# Patient Record
Sex: MALE | Race: WHITE
[De-identification: names, ages, dates, MRNs, and addresses within clinical notes are randomized per-mention and may not be internally consistent; named-entity substitution may affect disease eponyms.]

---

## 2018-09-20 ENCOUNTER — HOSPITAL ENCOUNTER (INPATIENT)
Dept: HOSPITAL 92 - ERS | Age: 58
LOS: 5 days | Discharge: ANOTHER HOSPITAL | DRG: 435 | End: 2018-09-25
Attending: INTERNAL MEDICINE | Admitting: INTERNAL MEDICINE
Payer: COMMERCIAL

## 2018-09-20 VITALS — BODY MASS INDEX: 25.4 KG/M2

## 2018-09-20 DIAGNOSIS — K62.5: ICD-10-CM

## 2018-09-20 DIAGNOSIS — E61.1: ICD-10-CM

## 2018-09-20 DIAGNOSIS — F17.220: ICD-10-CM

## 2018-09-20 DIAGNOSIS — R16.0: ICD-10-CM

## 2018-09-20 DIAGNOSIS — C78.89: ICD-10-CM

## 2018-09-20 DIAGNOSIS — D64.9: ICD-10-CM

## 2018-09-20 DIAGNOSIS — M41.9: ICD-10-CM

## 2018-09-20 DIAGNOSIS — C18.9: ICD-10-CM

## 2018-09-20 DIAGNOSIS — D50.0: ICD-10-CM

## 2018-09-20 DIAGNOSIS — Z86.010: ICD-10-CM

## 2018-09-20 DIAGNOSIS — K83.1: ICD-10-CM

## 2018-09-20 DIAGNOSIS — C78.7: Primary | ICD-10-CM

## 2018-09-20 DIAGNOSIS — Z87.442: ICD-10-CM

## 2018-09-20 LAB
ALBUMIN SERPL BCG-MCNC: 2.8 G/DL (ref 3.5–5)
ALP SERPL-CCNC: 602 U/L (ref 40–150)
ALT SERPL W P-5'-P-CCNC: 111 U/L (ref 8–55)
ANION GAP SERPL CALC-SCNC: 14 MMOL/L (ref 10–20)
ANION GAP SERPL CALC-SCNC: 9 MMOL/L (ref 10–20)
APTT PPP: 29 SEC (ref 22.9–36.1)
AST SERPL-CCNC: 117 U/L (ref 5–34)
BILIRUB SERPL-MCNC: 18.6 MG/DL (ref 0.2–1.2)
BUN SERPL-MCNC: 10 MG/DL (ref 8.4–25.7)
BUN SERPL-MCNC: 9 MG/DL (ref 8.4–25.7)
CALCIUM SERPL-MCNC: 8.3 MG/DL (ref 7.8–10.44)
CALCIUM SERPL-MCNC: 8.7 MG/DL (ref 7.8–10.44)
CHLORIDE SERPL-SCNC: 102 MMOL/L (ref 98–107)
CHLORIDE SERPL-SCNC: 105 MMOL/L (ref 98–107)
CO2 SERPL-SCNC: 23 MMOL/L (ref 22–29)
CO2 SERPL-SCNC: 25 MMOL/L (ref 22–29)
CREAT CL PREDICTED SERPL C-G-VRATE: 0 ML/MIN (ref 70–130)
CREAT CL PREDICTED SERPL C-G-VRATE: 135 ML/MIN (ref 70–130)
GLOBULIN SER CALC-MCNC: 3.5 G/DL (ref 2.4–3.5)
GLUCOSE SERPL-MCNC: 101 MG/DL (ref 70–105)
GLUCOSE SERPL-MCNC: 127 MG/DL (ref 70–105)
HGB BLD-MCNC: 7.6 G/DL (ref 14–18)
HGB BLD-MCNC: 8 G/DL (ref 14–18)
INR PPP: 1.1
IRON SERPL-MCNC: 19 UG/DL (ref 65–175)
LIPASE SERPL-CCNC: 4 U/L (ref 8–78)
MCH RBC QN AUTO: 24.7 PG (ref 27–31)
MCH RBC QN AUTO: 25.1 PG (ref 27–31)
MCV RBC AUTO: 80.7 FL (ref 78–98)
MCV RBC AUTO: 81 FL (ref 78–98)
MDIFF COMPLETE?: YES
MDIFF COMPLETE?: YES
PLATELET # BLD AUTO: 232 THOU/UL (ref 130–400)
PLATELET # BLD AUTO: 243 THOU/UL (ref 130–400)
PLATELET BLD QL SMEAR: (no result)
PLATELET BLD QL SMEAR: (no result)
POLYCHROMASIA BLD QL SMEAR: (no result) (100X)
POLYCHROMASIA BLD QL SMEAR: (no result) (100X)
POTASSIUM SERPL-SCNC: 3.8 MMOL/L (ref 3.5–5.1)
POTASSIUM SERPL-SCNC: 4 MMOL/L (ref 3.5–5.1)
PROTHROMBIN TIME: 14.8 SEC (ref 12–14.7)
RBC # BLD AUTO: 3.09 MILL/UL (ref 4.7–6.1)
RBC # BLD AUTO: 3.19 MILL/UL (ref 4.7–6.1)
SODIUM SERPL-SCNC: 135 MMOL/L (ref 136–145)
SODIUM SERPL-SCNC: 135 MMOL/L (ref 136–145)
TARGETS BLD QL SMEAR: (no result) (100X)
TARGETS BLD QL SMEAR: (no result) (100X)
UIBC SERPL-MCNC: 344 MCG/DL (ref 261–462)
WBC # BLD AUTO: 8.1 THOU/UL (ref 4.8–10.8)
WBC # BLD AUTO: 8.7 THOU/UL (ref 4.8–10.8)

## 2018-09-20 PROCEDURE — 85049 AUTOMATED PLATELET COUNT: CPT

## 2018-09-20 PROCEDURE — 0FB03ZX EXCISION OF LIVER, PERCUTANEOUS APPROACH, DIAGNOSTIC: ICD-10-PCS | Performed by: RADIOLOGY

## 2018-09-20 PROCEDURE — 88333 PATH CONSLTJ SURG CYTO XM 1: CPT

## 2018-09-20 PROCEDURE — 82378 CARCINOEMBRYONIC ANTIGEN: CPT

## 2018-09-20 PROCEDURE — 85014 HEMATOCRIT: CPT

## 2018-09-20 PROCEDURE — 85730 THROMBOPLASTIN TIME PARTIAL: CPT

## 2018-09-20 PROCEDURE — 88341 IMHCHEM/IMCYTCHM EA ADD ANTB: CPT

## 2018-09-20 PROCEDURE — 85007 BL SMEAR W/DIFF WBC COUNT: CPT

## 2018-09-20 PROCEDURE — S0028 INJECTION, FAMOTIDINE, 20 MG: HCPCS

## 2018-09-20 PROCEDURE — 47000 NEEDLE BIOPSY OF LIVER PERQ: CPT

## 2018-09-20 PROCEDURE — 36415 COLL VENOUS BLD VENIPUNCTURE: CPT

## 2018-09-20 PROCEDURE — 80053 COMPREHEN METABOLIC PANEL: CPT

## 2018-09-20 PROCEDURE — 85018 HEMOGLOBIN: CPT

## 2018-09-20 PROCEDURE — 80074 ACUTE HEPATITIS PANEL: CPT

## 2018-09-20 PROCEDURE — 85025 COMPLETE CBC W/AUTO DIFF WBC: CPT

## 2018-09-20 PROCEDURE — 88307 TISSUE EXAM BY PATHOLOGIST: CPT

## 2018-09-20 PROCEDURE — 30233N1 TRANSFUSION OF NONAUTOLOGOUS RED BLOOD CELLS INTO PERIPHERAL VEIN, PERCUTANEOUS APPROACH: ICD-10-PCS | Performed by: INTERNAL MEDICINE

## 2018-09-20 PROCEDURE — A4216 STERILE WATER/SALINE, 10 ML: HCPCS

## 2018-09-20 PROCEDURE — 93005 ELECTROCARDIOGRAM TRACING: CPT

## 2018-09-20 PROCEDURE — 82728 ASSAY OF FERRITIN: CPT

## 2018-09-20 PROCEDURE — 83690 ASSAY OF LIPASE: CPT

## 2018-09-20 PROCEDURE — 77002 NEEDLE LOCALIZATION BY XRAY: CPT

## 2018-09-20 PROCEDURE — 83550 IRON BINDING TEST: CPT

## 2018-09-20 PROCEDURE — 88342 IMHCHEM/IMCYTCHM 1ST ANTB: CPT

## 2018-09-20 PROCEDURE — 85027 COMPLETE CBC AUTOMATED: CPT

## 2018-09-20 PROCEDURE — 83540 ASSAY OF IRON: CPT

## 2018-09-20 PROCEDURE — 85610 PROTHROMBIN TIME: CPT

## 2018-09-20 RX ADMIN — Medication SCH ML: at 21:10

## 2018-09-20 RX ADMIN — Medication PRN ML: at 21:10

## 2018-09-20 RX ADMIN — FAMOTIDINE SCH MG: 10 INJECTION, SOLUTION INTRAVENOUS at 08:22

## 2018-09-20 RX ADMIN — FAMOTIDINE SCH MG: 10 INJECTION, SOLUTION INTRAVENOUS at 21:10

## 2018-09-20 RX ADMIN — Medication SCH ML: at 08:26

## 2018-09-20 NOTE — PDOC.PN
- Subjective


Encounter Start Date: 09/20/18


Encounter Start Time: 12:25


Subjective: f/u for painless jaundice and liver mass on CT scan. No current 

complaints.





- Objective


Resuscitation Status: 


 











Resuscitation Status           FULL:Full Resuscitation














MAR Reviewed: Yes


Vital Signs & Weight: 


 Vital Signs (12 hours)











  Temp Pulse Resp BP BP Pulse Ox


 


 09/20/18 08:00  98.3 F  81  20  113/70   96


 


 09/20/18 05:25  98.4 F  79  20  109/69   94 L


 


 09/20/18 02:37       91 L


 


 09/20/18 02:00  99.1 F  85  20   119/84  91 L








 Weight











Weight                         192 lb 8 oz














I&O: 


 











 09/19/18 09/20/18 09/21/18





 06:59 06:59 06:59


 


Intake Total  800 


 


Balance  800 











Result Diagrams: 


 09/20/18 05:43





 09/20/18 05:43


Additional Labs: 





 Laboratory Tests











  09/20/18 09/20/18 09/20/18





  00:47 00:47 05:43


 


Hgb   8.0 L 


 


Plt Count   243 


 


Neutrophils % (Manual)   85 H  77 H


 


PT   


 


INR   


 


APTT   


 


Iron   


 


TIBC   


 


% Saturation   


 


Total Bilirubin  18.6 H  


 


AST  117 H  


 


ALT  111 H  


 


Alkaline Phosphatase  602 H  


 


Carcinoembryonic Ag   














  09/20/18 09/20/18 09/20/18





  05:43 08:40 08:40


 


Hgb   


 


Plt Count   


 


Neutrophils % (Manual)   


 


PT    14.8 H


 


INR    1.1


 


APTT    29.0


 


Iron   19 L 


 


TIBC   344 


 


% Saturation   6 L 


 


Total Bilirubin   


 


AST   


 


ALT   


 


Alkaline Phosphatase   


 


Carcinoembryonic Ag  6.96 H  














Phys Exam





- Physical Examination


Constitutional: NAD


+ jaundice, alert, talkative


+ scleral icterus


HEENT: PERRLA, oral pharynx no lesions


Neck: no nodes, no JVD, supple, full ROM


Respiratory: no wheezing, no rales, no rhonchi, clear to auscultation bilateral


S1, S2


Cardiovascular: RRR, no significant murmur, no rub, gallop


+ TTP


Gastrointestinal: soft, no distention, positive bowel sounds


Musculoskeletal: no edema, pulses present


Neurological: non-focal, normal sensation, moves all 4 limbs


Psychiatric: A&O x 3


Skin: no rash, normal turgor, cap refill <2 seconds





Dx/Plan


(1) Liver mass


Code(s): R16.0 - HEPATOMEGALY, NOT ELSEWHERE CLASSIFIED   Status: Acute   

Comment: ? etiology, plan for CT guided percutaneous bx today, Medical Oncology 

consulted   





(2) Hyperbilirubinemia


Code(s): E80.6 - OTHER DISORDERS OF BILIRUBIN METABOLISM   Status: Acute   

Comment: Secondary to obstructive hepatic mass, continue IVF's, see #1   





(3) Melena


Code(s): K92.1 - MELENA   Status: Acute   Comment: ? chronic, serial H/H, GI 

consulted, avoid NSAIDs and anticoagulation   





(4) Normocytic anemia


Code(s): D64.9 - ANEMIA, UNSPECIFIED   Status: Chronic   Comment: ? chronicity 

without prior labs to compare, s/p 1u PRBC's, serial H/H, s/p Iron infusion   





(5) Iron deficiency


Code(s): E61.1 - IRON DEFICIENCY   Status: Acute   Comment: s/p Iron Sucrose 

infusion, likely will need iron replacement longterm   





- Plan


Stable currently


-: Plan for CT guided liver bx today


-: Continue IVF's


-: GI consult pending


-: AM lab: CMP, CBC, Hepatitis panel





* .

## 2018-09-20 NOTE — CON
GASTROENTEROLOGY CONSULTATION NOTE

 

DATE OF CONSULTATION:  09/20/2018

 

CHIEF COMPLAINT:  Blood in stool.

 

HISTORY OF PRESENT ILLNESS:  Mr. Zimmerman is a 58-year-old man who was [facility] for evaluation of jaundice and blood in the stool and anemia.  He is an inmate at the [facility] nursing home.  He reports that he has had streaks of red blood with the stool on and off since April.  
He has had no significant change in the degree of bleeding recently.  He has had no nausea, vomiting 
or abdominal pain, no diarrhea or constipation.  He has had some weight loss.  He has noticed progres
sive jaundice over the last week.

 

PAST MEDICAL HISTORY:  Scoliosis, kidney stones.

 

FAMILY HISTORY:  Negative for GI malignancy.

 

SOCIAL HISTORY:  No alcohol, tobacco, or drugs.  Currently, he smoked cigarettes in the past.  He is 
incarcerated.

 

ALLERGIES:  MORPHINE.

 

MEDICATIONS PRIOR TO ADMISSION:  None.

 

REVIEW OF SYSTEMS:  Negative x10 systems reviewed except as stated in the history of present illness.


 

PHYSICAL EXAMINATION:

VITAL SIGNS:  Temperature 98.2, pulse 86, blood pressure 116/72.

GENERAL:  He is in no acute distress.  He is jaundiced.

HEENT:  He has scleral icterus.  Oropharynx is clear without lesions.

NECK:  No cervical or supraclavicular lymphadenopathy.

LUNGS:  Clear to auscultation bilaterally.

HEART:  Regular rate and rhythm without murmur.

ABDOMEN:  Soft, nontender, nondistended.  Bowel sounds are present.

EXTREMITIES:  No lower extremity edema.

 

LABORATORY DATA:  White blood cell count 8.1, hemoglobin 7.6.  He apparently had a unit of blood ramirez
sfusion, however, in [place] before transfer here, platelets 232,000.  INR 1.1, creatinine 0.72, b
ilirubin 18.6, , , alkaline phosphatase 602, lipase 4.  CEA 6.96.  He had an acute hepa
titis panel for A, B, and C, which were negative.

 

IMAGING:  Review of the report of the CT scan done in [place] showed liver mass with compression o
f the common hepatic duct with no dilation of the distal common bile duct.  No obvious colon mass was
 noted by the original report; however, Dr. Jones reviewed the CT with Radiology here and a 5-cm sigm
oid mass with regional lymphadenopathy was indicated.

 

IMPRESSION:

1.  Obstructive jaundice with liver metastases, causing obstruction of the common hepatic duct.

2.  Apparent metastatic cancer to the liver, most likely a colon cancer based on the history of the C
T did show a colon mass.  Liver biopsy was done today.

3.  Anemia secondary to chronic blood loss, most likely from a colon mass.  The patient tells me that
 he had a colonoscopy or possibly a partial flexible sigmoidoscopy at business, he was working for th
at [employer] in [place], [place].  He said this was [facility].  He says he d
id take a bowel prep, but was not sedated for the exam.  He reports no polyps were removed.  He state
s this was done 3 years ago.  He had told Dr. Jones that he had an actual colonoscopy with polyps rem
masoud a few months ago.  Obviously, this history is not consistent.

 

RECOMMENDATIONS:

1.  He will ultimately require ERCP with stenting of the hepatic ducts to allow drainage and potentia
l for chemotherapy.

2.  Again eventually colonoscopy to evaluate primary tumor can be performed.

3.  We will await the biopsy results.  He will likely require systemic chemotherapy for this.  Pat field, he has no pain and no signs of infection regarding biliary obstruction.  This will require more 
proximal stenting and frequent exchange of the stents.  This may be better done in the referral [facility]  If he is to start chemo that will likely also have to be done in the referral center down in [facility].  We will address with a  to initiate transfer to [place].

## 2018-09-20 NOTE — CT
CT GUIDED LIVER BIOPSY

9/20/18

 

CLINICAL HISTORY: 

Colonic mass with multiple liver masses. Jaundice. 

 

PROCEDURE:

Informed consent was obtained and the patient was escorted to the procedural suite and placed into a 
semisupine position. The right abdomen was prepped and draped in the standard sterile fashion and mas
s of interest within the central aspect of the liver was localized. Conscious sedation was provided t
o the patient and patient was monitored accordingly, throughout the duration of the exam by the radio
logy nurse, Kayley Yarbrough, in stable condition. After local anesthesia with buffered 1% lidocaine wa
s performed, a small skin incision was made through which a 17 gauge trocar was advanced to the mass 
of interest within the central aspect of the liver. The inner stylet was removed and was exchanged fo
r an 18 gauge biopsy device. Two separate core specimens were acquired and were submitted to the path
ologist attending the case, Dr. John Paul Ventura. The specimens were reviewed during the exam and were d
eemed adequate for interpretation. Therefore, the exam was terminated and all devices were removed fr
om the patient. There were no procedural complications. Postprocedural imaging revealed no significan
t complication.

 

IMPRESSION:  

Technically successful CT guided biopsy of hepatic mass. Pathology results are pending. 

 

POS: [facility]

## 2018-09-20 NOTE — HP
CHIEF COMPLAINT:  Bright red blood per rectum.

 

HISTORIAN:  The patient is somewhat reliable.

 

HISTORY OF PRESENT ILLNESS:  This is a 58-year-old male with a past medical history of scoliosis, kid
lalita stones, colon polyps, status post removal, presenting to ED due to jaundice and anemia.  Per the 
patient, he has been having bright red blood per rectum for the past couple of days and patient went 
to [facility] [place] and the patient was transferred to our hospital for higher level 
of care.  The patient's workup at [place] showed the patient has anemia of 7.4, needing transfusio
n and also patient is jaundiced with scleral icterus per patient's chart and electronic medical recor
ds.  Patient had been feeling some shortness of breath, body aches, hypotensive and patient was recen
tly diagnosed with hepatitis C as well and patient also having a week of bright red per rectum.  A CT
 of the abdomen was done, which showed liver mass with obstruction of bile duct.

 

REVIEW OF SYSTEMS:  Positive for jaundice, shortness of breath, body aches, bright red blood per rect
um, otherwise as documented in HPI.  All other systems were reviewed and are negative.

 

PAST MEDICAL HISTORY:  Scoliosis, nephrolithiasis, colon polyp, status post removal.

 

FAMILY HISTORY:  Reviewed and noncontributory.

 

PAST SURGICAL HISTORY:  Removal of polyps in the colon.

 

PSYCHIATRIC HISTORY:  PTSD.

 

SOCIAL HISTORY:  Patient drinks socially.  Patient denies illicit drug use.  The patient states that 
he uses tobacco, he chews it and drinks the fluid of the tobacco.  Patient has smoked for 30 years an
d patient lives in snf.  Patient is an inmate.

 

ALLERGIES:  MORPHINE.

 

CURRENT MEDICATIONS:  Patient does not take any medication at this time.

 

PHYSICAL EXAMINATION:

VITAL SIGNS:  Patient's blood pressure in the ED 97/76, pulse 80, respiratory rate of 19, temperature
 of 98.6, O2 sat of 99.

GENERAL APPEARANCE:  The patient is jaundiced, sitting in bed, speaking in full sentences, very pleas
ant.  Patient is in handcuffed and shackled to the bed.

HEENT:  Normocephalic, atraumatic.  Pupils are equally round and reactive to light.  Extraocular move
ments are intact.  Patient has scleral icterus.

NECK:  Supple.  Trachea is midline.  No JVD is noted.  Mucous membranes are moist.

LUNGS:  Clear to auscultation bilaterally.  No wheezing, no rales, no rhonchi is appreciated.

CARDIOVASCULAR:  Positive S1, S2.  Regular rate and rhythm. No murmurs, no gallops, no rubs appreciat
ed.

ABDOMEN:  Soft, nontender, nondistended.  Positive bowel sounds in all quadrants.  The patient has he
patomegaly noted at the right upper quadrant.  The patient has some tenderness at the right upper ángela
drant.  Upper extremities:  Patient has 5/5 upper extremity strength, 2+ radial pulses.  Patient has 
handcuffed around the wrists bilaterally.  Lower extremity.  Patient have handcuffs at the ankles risa
aterally.  The patient has good strength at the lower extremities.

NEUROLOGIC:  Cranial nerves II through XII grossly intact.  No neurological deficits noted.

SKIN:  Patient is jaundiced, pale.  No rash.  Skin is warm and dry.

PSYCHIATRIC:  Alert, oriented x3, normal affect, very pleasant.

 

IMAGING:  EKG:  Normal sinus rhythm.  CT of the abdomen and pelvis showed a large 7.9 x 5.5 cm mass i
n the central aspect of the right hepatic lobe.  This is concerning for malignancy.  The mass appears
 to compress the posterior aspect of the common hepatic duct and result in secondary obstruction.  Th
e distal common bile duct is not significantly dilated.  The gallbladder appears to be contracted, sm
all amount of free fluid is seen in the pelvis.

 

LABORATORY DATA:  WBC is 8.7, hemoglobin is 8.0, hematocrit 25.7, platelets 243,000.  Sodium 135, pot
assium 3.8, chloride 102, carbon dioxide 23, BUN 10, creatinine 0.75, glucose 127, calcium 8.7, total
 bilirubin is 18.66, AST is 117, ALT is 111, alkaline phosphatase is 602, albumin 2.8, lipase is 4.

 

ASSESSMENT AND PLAN:  This is a 58-year-old male being admitted for:

1.  Anemia of acute blood loss.  The patient's hemoglobin at [place] was 7.4.  Patient is status p
ost 1 unit of transfusion.  Currently, patient's hemoglobin is 8.  At this point, we will monitor pat
ient's H&H.  We have consulted GI since patient is having bright red blood per rectum.

2.  Jaundice secondary to large obstructive mass seen on CT of the abdomen and pelvis.  At this point
, we have consulted gastroenterology.  We will follow up with gastroenterology regarding any further 
recommendations.

3.  Malignancy.  We have consulted Heme/Oncology.  Patient to possibly has malignancy and will need f
urther workup.  We will refer further recommendations to heme/oncologist.

4.  Transaminitis.  Patient's AST and  and 111 respectively with alkaline phosphatase of 602. 
 Patient's transaminitis is probably due to obstructive biliary duct due to malignancy.  At this poin
t, we will monitor the patient.  I will follow up with gastroenterology's recommendations and also he
me/oncologist's recommendations.

5.  Deep venous thrombosis and gastrointestinal prophylaxis.  We will do sequential compression devic
es and we are not going to do any anticoagulation due to patient's bleed and we will do Pepcid for ga
strointestinal prophylaxis.

## 2018-09-20 NOTE — CON
DATE OF CONSULTATION:  09/20/2018

 

REASON FOR CONSULTATION:  Liver mass.

 

HISTORY OF PRESENT ILLNESS:  A 58-year-old male with past medical history of colon polyps status post
 removal in 04/2018, scoliosis, kidney stones, presenting to the hospital with severe jaundice and br
ight red blood per rectum.  The patient states he has been having bright red blood per rectum for the
 past few days and the patient went to an urgent care center in [place] and transferred to [facility] for higher level of care.

 

In [place] the patient's hemoglobin was 7.4 requiring transfusion.  The patient states he first no
ticed yellowing of his skin approximately 1 week ago when a fellow inmate told him his neck looked ye
llow.  He says it has progressively worsened since then.  The patient states he has lost 20 pounds in
 the last couple of months, but has recently gained 5 pounds back after eating "better food".  He den
ies fevers or night sweats, nausea, vomiting, diarrhea, constipation or abdominal pain, discomfort or
 swelling.  He states he has a good appetite.  He denies any family history of cancers other than pro
state cancer in his father.  The patient states he was diagnosed with an enlarged prostate at age 17,
 but has never been diagnosed with prostate cancer.  The patient states his last colonoscopy was in 0
4/2018 and he had multiple polyps removed that he says were all benign.  The patient denies history o
f IV drug use or viral hepatitis.  The patient has chewed tobacco since 1976 and swallows the juices.
  He denies cigarette use.  He drinks beer and wine 3-4 times per week.

 

REVIEW OF SYSTEMS:  Ten system review of systems completed and pertinent positives are in the HPI, ot
herwise negative.

 

PAST MEDICAL HISTORY:  Colon polyps, scoliosis, kidney stones.

 

FAMILY HISTORY:  Prostate cancer in the father.  Otherwise, no family history of cancer.

 

PAST SURGICAL HISTORY:  Colon polyp removal.

 

PSYCHIATRIC HISTORY:  PTSD.

 

SOCIAL HISTORY:  Social drinking, of beer and wine 3-4 days per week.  Chewing tobacco since 1976 and
 drinks the fluid of the tobacco.  Denies cigarette use.  The patient is an inmate.

 

ALLERGIES:  MORPHINE.

 

CURRENT MEDICATIONS:  Reviewed.

 

PHYSICAL EXAMINATION:

VITAL SIGNS:  Temperature 98.3, pulse 81, respirations 20, satting 96% on room air, blood pressure 11
3/70.

GENERAL:  The patient is lying comfortably in bed in no acute distress.

HEENT:  Scleral icterus present, jaundice of the oral mucosa.

CARDIOVASCULAR:  Normal S1, S2, regular rate and rhythm.  No murmurs, rubs or gallops.

RESPIRATORY:  Clear to auscultation bilaterally.  No wheezes, rales or rhonchi.

ABDOMEN:  Soft, nondistended, mild tenderness to palpation in the right upper quadrant with mild guar
ding.

EXTREMITIES:  No edema.

SKIN:  Diffuse yellowing of the skin consistent with jaundice.

NEUROLOGIC:  Nonfocal.

PSYCHIATRIC:  Awake, alert and oriented x3, cooperative.

 

LABORATORY DATA:  White blood cells 8.1, hemoglobin 7.6, hematocrit 25, platelets 232.  Sodium 135, B
UN 9, creatinine 0.72, total bilirubin 18.6, , , alkaline phosphatase 602, albumin 2.8,
 iron 19, percent saturation 6, TIBC 344, ferritin 57.88, CEA 6.96.  INR 1.1, PTT 29.

 

IMAGING DATA:  CT abdomen and pelvis with IV contrast at outside hospital, dated 09/19/2018, large li
mike mass, nonspecific and not consistent with HCC.  Reviewed by Radiology at [place] confirms like
ly 3 separate liver masses, 2 in the right lobe, 1 in the left lobe of the liver located near the charles
er dome.  Also noted is a large approximately 5 cm sigmoid colonic mass with associated enlarged giovanna
onal lymphadenopathy.

 

ASSESSMENT AND PLAN:  A 58-year-old  male with history of colon polyps, presenting with juany
re jaundice and bright red blood per rectum.  The patient's blood work shows a total bilirubin of 18.
6 with an alkaline phosphatase of 602,  and  with low albumin of 2.8.  CEA is 6.96.  Th
e patient has iron deficiency as well.  Hemoglobin is depressed at 7.6.  Otherwise, there has normal 
white blood cells and platelets.  The patient has 3 large liver masses on CT along with a 5 cm sigmoi
d mass with regional lymphadenopathy concerning for colon primary with metastatic disease to the live
r.  I have discussed the imaging in his case with Interventional Radiology and have scheduled him for
 percutaneous liver biopsy to confirm metastatic disease from the colon.  Gastroenterology has been c
onsulted for consideration of ERCP and stent placement as well as endoscopy to evaluate the sigmoid m
ass seen on CT.  Liver biopsy may be done today versus tomorrow.  Gastroenterology should be seeing t
he patient this afternoon.  The patient would benefit from IV iron to replace his stores and improve 
his hemoglobin in the interim and I have ordered this to be started today.  We will continue to follo
w this patient and await final pathology from the biopsies.

 

Thank you for this consult.

## 2018-09-21 LAB
ALBUMIN SERPL BCG-MCNC: 2.4 G/DL (ref 3.5–5)
ALP SERPL-CCNC: 560 U/L (ref 40–150)
ALT SERPL W P-5'-P-CCNC: 102 U/L (ref 8–55)
ANION GAP SERPL CALC-SCNC: 12 MMOL/L (ref 10–20)
AST SERPL-CCNC: 120 U/L (ref 5–34)
BILIRUB SERPL-MCNC: 15.5 MG/DL (ref 0.2–1.2)
BUN SERPL-MCNC: 7 MG/DL (ref 8.4–25.7)
CALCIUM SERPL-MCNC: 8.4 MG/DL (ref 7.8–10.44)
CHLORIDE SERPL-SCNC: 107 MMOL/L (ref 98–107)
CO2 SERPL-SCNC: 21 MMOL/L (ref 22–29)
CREAT CL PREDICTED SERPL C-G-VRATE: 162 ML/MIN (ref 70–130)
GLOBULIN SER CALC-MCNC: 3.3 G/DL (ref 2.4–3.5)
GLUCOSE SERPL-MCNC: 123 MG/DL (ref 70–105)
HBCM INDEX: 0.09 S/CO (ref 0–0.79)
HBSAG INDEX: 0.21 S/CO (ref 0–0.99)
HEP A IGM S/CO: 0.08 S/CO (ref 0–0.79)
HEP C INDEX: 0.08 S/CO (ref 0–0.79)
HGB BLD-MCNC: 8.3 G/DL (ref 14–18)
MCH RBC QN AUTO: 25.2 PG (ref 27–31)
MCV RBC AUTO: 80.6 FL (ref 78–98)
MDIFF COMPLETE?: YES
PLATELET # BLD AUTO: 236 THOU/UL (ref 130–400)
PLATELET BLD QL SMEAR: (no result)
POTASSIUM SERPL-SCNC: 3.5 MMOL/L (ref 3.5–5.1)
RBC # BLD AUTO: 3.3 MILL/UL (ref 4.7–6.1)
SODIUM SERPL-SCNC: 136 MMOL/L (ref 136–145)
WBC # BLD AUTO: 8.3 THOU/UL (ref 4.8–10.8)

## 2018-09-21 RX ADMIN — Medication SCH ML: at 20:43

## 2018-09-21 RX ADMIN — Medication PRN ML: at 20:43

## 2018-09-21 RX ADMIN — Medication SCH ML: at 11:13

## 2018-09-21 RX ADMIN — FAMOTIDINE SCH MG: 10 INJECTION, SOLUTION INTRAVENOUS at 11:13

## 2018-09-21 RX ADMIN — FAMOTIDINE SCH MG: 10 INJECTION, SOLUTION INTRAVENOUS at 20:43

## 2018-09-21 NOTE — PRG
DATE OF SERVICE:  09/21/2018

 

SUBJECTIVE:  Mr. Zimmerman did have some pruritus today, which improved with diphenhydramine.  He has n
o abdominal pain.  He is tolerating a regular diet.

 

OBJECTIVE:

VITAL SIGNS:  Temperature 98.8, pulse 85, blood pressure 101/62.

GENERAL:  He is in no acute distress.  He is jaundiced.

LUNGS:  Clear to auscultation bilaterally.

HEART:  Regular rate and rhythm.

ABDOMEN:  Soft, nontender, nondistended.  Bowel sounds are present.

EXTREMITIES:  No lower extremity edema.

 

IMPRESSION:

1.  Obstructive jaundice secondary to apparent malignant stricture at the level of the common hepatic
 duct or higher.  He has no signs of cholangitis and has minimal symptoms associated with this.

2.  Reported thickening of the colon on CT when outside films were reviewed between Oncology and Radi
ology.  There is evidence of significant metastatic disease in the liver and a liver biopsy results a
re pending.

 

RECOMMENDATIONS:

1.  He will ultimately require ERCP for stent placement for the biliary stricture.  If this is a mathieu
gnant stricture.  Then, a permanent stent could be considered.  As long as there is no infection at t
his point, this will be deferred to the referral center.

2.  Await pathology report from the biopsies.

3.  Anticipate transfer to [facility] for further care.

## 2018-09-21 NOTE — PDOC.PN
- Subjective


Encounter Start Date: 09/21/18


Encounter Start Time: 12:30


Subjective: f/u for hepatic mass and jaundice. Overall feels ok but c/o itching 


-: diffusely. No N/V or fever.





- Objective


Resuscitation Status: 


 











Resuscitation Status           FULL:Full Resuscitation














MAR Reviewed: Yes


Vital Signs & Weight: 


 Vital Signs (12 hours)











  Temp Pulse Resp BP Pulse Ox


 


 09/21/18 07:51  98.8 F  85  18  101/62  94 L








 Weight











Weight                         192 lb 8 oz














I&O: 


 











 09/20/18 09/21/18 09/22/18





 06:59 06:59 06:59


 


Intake Total 800 840 


 


Balance 800 840 











Result Diagrams: 


 09/21/18 04:25





 09/21/18 04:25


Additional Labs: 





 Laboratory Tests











  09/20/18 09/20/18 09/20/18





  00:47 00:47 05:43


 


Hgb   8.0 L  7.6 L


 


Plt Count   243 


 


Neutrophils % (Manual)   85 H  77 H


 


PT   


 


INR   


 


APTT   


 


Iron   


 


TIBC   


 


% Saturation   


 


Total Bilirubin  18.6 H  


 


AST  117 H  


 


ALT  111 H  


 


Alkaline Phosphatase  602 H  


 


Carcinoembryonic Ag   














  09/20/18 09/20/18 09/20/18





  05:43 08:40 08:40


 


Hgb   


 


Plt Count   


 


Neutrophils % (Manual)   


 


PT    14.8 H


 


INR    1.1


 


APTT    29.0


 


Iron   19 L 


 


TIBC   344 


 


% Saturation   6 L 


 


Total Bilirubin   


 


AST   


 


ALT   


 


Alkaline Phosphatase   


 


Carcinoembryonic Ag  6.96 H  














  09/21/18 09/21/18





  04:25 04:25


 


Hgb  


 


Plt Count  


 


Neutrophils % (Manual)   65


 


PT  


 


INR  


 


APTT  


 


Iron  


 


TIBC  


 


% Saturation  


 


Total Bilirubin  15.5 H 


 


AST  120 H 


 


ALT  102 H 


 


Alkaline Phosphatase  560 H 


 


Carcinoembryonic Ag  














Phys Exam





- Physical Examination


Constitutional: NAD


+ jaundice


+ scleral icterus


HEENT: PERRLA, oral pharynx no lesions


Neck: no nodes, no JVD, supple, full ROM


Respiratory: no wheezing, no rales, no rhonchi, clear to auscultation bilateral


S1, S2


Cardiovascular: RRR, no significant murmur, no rub, gallop


mild RUQ TTP


Gastrointestinal: soft, no distention, positive bowel sounds


Musculoskeletal: no edema, pulses present


Neurological: non-focal, normal sensation, moves all 4 limbs


Psychiatric: normal affect, A&O x 3


Deviation from normal: + jaundice


Skin: normal turgor, cap refill <2 seconds





Dx/Plan


(1) Liver mass


Code(s): R16.0 - HEPATOMEGALY, NOT ELSEWHERE CLASSIFIED   Status: Acute   

Comment: ? etiology, s/p CT guided percutaneous bx with pathology pending, 

Medical Oncology consulted   





(2) Hyperbilirubinemia


Code(s): E80.6 - OTHER DISORDERS OF BILIRUBIN METABOLISM   Status: Acute   

Comment: Secondary to obstructive hepatic mass, continue IVF's, see #1   





(3) Melena


Code(s): K92.1 - MELENA   Status: Acute   Comment: ? chronic, serial H/H, GI 

consulted, avoid NSAIDs and anticoagulation   





(4) Normocytic anemia


Code(s): D64.9 - ANEMIA, UNSPECIFIED   Status: Chronic   Comment: ? chronicity 

without prior labs to compare, s/p 1u PRBC's, serial H/H, s/p Iron infusion   





(5) Iron deficiency


Code(s): E61.1 - IRON DEFICIENCY   Status: Acute   Comment: s/p Iron Sucrose 

infusion, likely will need iron replacement longterm   





- Plan


DVT proph w/SCDs


Stable currently


-: Liver biopsy completed with final pathology pending


-: Likely will need ERCP with stent for long term mgmt


-: Benadryl 25mg po q6h prn pruritus


-: AM lab: CMP, CBC





* CM for coordination of transfer to [facility] for long term mgmt

## 2018-09-22 LAB
ALBUMIN SERPL BCG-MCNC: 2.4 G/DL (ref 3.5–5)
ALP SERPL-CCNC: 551 U/L (ref 40–150)
ALT SERPL W P-5'-P-CCNC: 100 U/L (ref 8–55)
ANION GAP SERPL CALC-SCNC: 13 MMOL/L (ref 10–20)
AST SERPL-CCNC: 107 U/L (ref 5–34)
BILIRUB SERPL-MCNC: 14.6 MG/DL (ref 0.2–1.2)
BUN SERPL-MCNC: 8 MG/DL (ref 8.4–25.7)
CALCIUM SERPL-MCNC: 8 MG/DL (ref 7.8–10.44)
CHLORIDE SERPL-SCNC: 104 MMOL/L (ref 98–107)
CO2 SERPL-SCNC: 20 MMOL/L (ref 22–29)
CREAT CL PREDICTED SERPL C-G-VRATE: 143 ML/MIN (ref 70–130)
GLOBULIN SER CALC-MCNC: 3.2 G/DL (ref 2.4–3.5)
GLUCOSE SERPL-MCNC: 114 MG/DL (ref 70–105)
HGB BLD-MCNC: 8.3 G/DL (ref 14–18)
MCH RBC QN AUTO: 25.4 PG (ref 27–31)
MCV RBC AUTO: 81.3 FL (ref 78–98)
MDIFF COMPLETE?: YES
PLATELET # BLD AUTO: 223 THOU/UL (ref 130–400)
PLATELET BLD QL SMEAR: (no result)
POLYCHROMASIA BLD QL SMEAR: (no result) (100X)
POTASSIUM SERPL-SCNC: 3.3 MMOL/L (ref 3.5–5.1)
RBC # BLD AUTO: 3.26 MILL/UL (ref 4.7–6.1)
SODIUM SERPL-SCNC: 134 MMOL/L (ref 136–145)
TARGETS BLD QL SMEAR: (no result) (100X)
WBC # BLD AUTO: 9 THOU/UL (ref 4.8–10.8)

## 2018-09-22 RX ADMIN — Medication SCH ML: at 09:13

## 2018-09-22 RX ADMIN — FAMOTIDINE SCH MG: 10 INJECTION, SOLUTION INTRAVENOUS at 09:13

## 2018-09-22 RX ADMIN — Medication SCH ML: at 20:27

## 2018-09-22 RX ADMIN — FAMOTIDINE SCH MG: 10 INJECTION, SOLUTION INTRAVENOUS at 20:26

## 2018-09-22 NOTE — PDOC.PN
- Subjective


Encounter Start Date: 09/22/18


Encounter Start Time: 11:15


Subjective: f/u for painless jaundice with hepatic mass noted on CT imaging. 


-: s/p hepatic biopsies awaiting pathology. No new complaints.


-: No abd pain, fever or nausea.





- Objective


Resuscitation Status: 


 











Resuscitation Status           FULL:Full Resuscitation














MAR Reviewed: Yes


Vital Signs & Weight: 


 Vital Signs (12 hours)











  Temp Pulse Resp BP Pulse Ox


 


 09/22/18 07:40  97.8 F  88  20  106/71  96








 Weight











Weight                         192 lb 8 oz














I&O: 


 











 09/21/18 09/22/18 09/23/18





 06:59 06:59 06:59


 


Intake Total 840 1350 


 


Balance 840 1350 











Result Diagrams: 


 09/22/18 04:57





 09/22/18 04:57


Additional Labs: 





 Laboratory Tests











  09/20/18 09/20/18 09/20/18





  00:47 00:47 05:43


 


Hgb   8.0 L  7.6 L


 


Plt Count   243 


 


Neutrophils % (Manual)   85 H  77 H


 


PT   


 


INR   


 


APTT   


 


Iron   


 


TIBC   


 


% Saturation   


 


Total Bilirubin  18.6 H  


 


AST  117 H  


 


ALT  111 H  


 


Alkaline Phosphatase  602 H  


 


Carcinoembryonic Ag   














  09/20/18 09/20/18 09/20/18





  05:43 08:40 08:40


 


Hgb   


 


Plt Count   


 


Neutrophils % (Manual)   


 


PT    14.8 H


 


INR    1.1


 


APTT    29.0


 


Iron   19 L 


 


TIBC   344 


 


% Saturation   6 L 


 


Total Bilirubin   


 


AST   


 


ALT   


 


Alkaline Phosphatase   


 


Carcinoembryonic Ag  6.96 H  














  09/21/18 09/21/18





  04:25 04:25


 


Hgb  


 


Plt Count  


 


Neutrophils % (Manual)   65


 


PT  


 


INR  


 


APTT  


 


Iron  


 


TIBC  


 


% Saturation  


 


Total Bilirubin  15.5 H 


 


AST  120 H 


 


ALT  102 H 


 


Alkaline Phosphatase  560 H 


 


Carcinoembryonic Ag  














Phys Exam





- Physical Examination


Constitutional: NAD


+ jaundice, smiling, talkative


+ scleral icterus


HEENT: PERRLA, oral pharynx no lesions


Neck: no nodes, no JVD, supple, full ROM


Respiratory: no wheezing, no rales, no rhonchi, clear to auscultation bilateral


S1, S2


Cardiovascular: RRR, no significant murmur, no rub, gallop


+ mild TTP in RUQ


Gastrointestinal: soft, no distention, positive bowel sounds


Musculoskeletal: no edema, pulses present


Neurological: non-focal, normal sensation, moves all 4 limbs


Psychiatric: normal affect, A&O x 3


Deviation from normal: + generalized jaundice


Skin: normal turgor, cap refill <2 seconds





Dx/Plan


(1) Liver mass


Code(s): R16.0 - HEPATOMEGALY, NOT ELSEWHERE CLASSIFIED   Status: Acute   

Comment: ? etiology but suspect malignant process, s/p CT guided percutaneous 

bx with pathology pending, Medical Oncology consulted   





(2) Hyperbilirubinemia


Code(s): E80.6 - OTHER DISORDERS OF BILIRUBIN METABOLISM   Status: Acute   

Comment: Secondary to obstructive hepatic mass, continue IVF's, see #1   





(3) Melena


Code(s): K92.1 - MELENA   Status: Acute   Comment: ? chronic, serial H/H, GI 

consulted, avoid NSAIDs and anticoagulation, H/H stable currently   





(4) Normocytic anemia


Code(s): D64.9 - ANEMIA, UNSPECIFIED   Status: Chronic   Comment: ? chronicity 

without prior labs to compare, s/p 1u PRBC's, serial H/H, s/p Iron infusion   





(5) Iron deficiency


Code(s): E61.1 - IRON DEFICIENCY   Status: Acute   Comment: s/p Iron Sucrose 

infusion, likely will need iron replacement longterm   





(6) Neoplasm of sigmoid colon


Code(s): D49.0 - NEOPLASM OF UNSPECIFIED BEHAVIOR OF DIGESTIVE SYSTEM   Status: 

Acute   Comment: suspected on CT imaging, will need colonoscopy to confirm, 

potential malignant process given hepatic masses   





- Plan


, out of bed/ambulate, DVT proph w/SCDs


Stable currently


-: KCL replacement


-: Await pathology of hepatic bx


-: OOB/ambulate


-: AM lab: CMP, H/H





* .

## 2018-09-22 NOTE — EKG
Test Reason : IRREGULAR RATE

Blood Pressure : ***/*** mmHG

Vent. Rate : 082 BPM     Atrial Rate : 082 BPM

   P-R Int : 152 ms          QRS Dur : 088 ms

    QT Int : 410 ms       P-R-T Axes : 031 -33 015 degrees

   QTc Int : 479 ms

 

Normal sinus rhythm

Left axis deviation

Abnormal ECG

 

Confirmed by BRODY MILLER, RSOS PIÑA ([id number]),  ROSEANN PICKENS ([id number]) on 9/22/2018 6:24:11 PM

 

Referred By: MD SKINNER           Confirmed By:ROSS SKINNER MD

## 2018-09-22 NOTE — PRG
DATE OF SERVICE:  09/22/2018

 

SUBJECTIVE:  Mr. Zimmerman has no acute complaints.  He took the Benadryl and has no further itching.  
No abdominal pain, no nausea or vomiting.  He is tolerating diet well.

 

OBJECTIVE:

VITAL SIGNS:  Temperature 97.8, pulse 88, blood pressure 106/71.

GENERAL:  He is jaundiced.

LUNGS:  Clear to auscultation bilaterally.

HEART:  Regular rate and rhythm without murmur.

ABDOMEN:  Soft, nontender, nondistended, bowel sounds are present.

EXTREMITIES:  No lower extremity edema.

NEUROLOGIC:  He is alert and oriented x3.

 

LABORATORY DATA:  White blood cell count 9.0, hemoglobin 8.3, platelets 223.  Creatinine 0.68, biliru
bin 14.6, , , alkaline phosphatase 551.  CEA was 6.96.

 

IMPRESSION:

1.  Obstructive jaundice secondary to malignant stricture in the proximal bile ducts.

2.  An apparent metastatic cancer to the liver with a suspected primary in the colon with a possible 
mass in the sigmoid by CT scan.

 

RECOMMENDATIONS:

1.  Awaiting histopathology from the liver biopsy.

2.  We are awaiting transfer to [place].  It is anticipated that he will require ERCP with stenting
 of the stricture prior to receiving chemotherapy or treatment for his cancer.  He shows no signs of 
infection.  So, there is no need for urgent ERCP at this point.  If the biopsies do confirm cancer, t
hen a permanent metal stents could be considered versus a plastic stent.

## 2018-09-23 LAB
ALBUMIN SERPL BCG-MCNC: 2.4 G/DL (ref 3.5–5)
ALP SERPL-CCNC: 565 U/L (ref 40–150)
ALT SERPL W P-5'-P-CCNC: 101 U/L (ref 8–55)
ANION GAP SERPL CALC-SCNC: 11 MMOL/L (ref 10–20)
AST SERPL-CCNC: 111 U/L (ref 5–34)
BILIRUB SERPL-MCNC: 15 MG/DL (ref 0.2–1.2)
BUN SERPL-MCNC: 7 MG/DL (ref 8.4–25.7)
CALCIUM SERPL-MCNC: 8.2 MG/DL (ref 7.8–10.44)
CHLORIDE SERPL-SCNC: 105 MMOL/L (ref 98–107)
CO2 SERPL-SCNC: 20 MMOL/L (ref 22–29)
CREAT CL PREDICTED SERPL C-G-VRATE: 149 ML/MIN (ref 70–130)
GLOBULIN SER CALC-MCNC: 3.3 G/DL (ref 2.4–3.5)
GLUCOSE SERPL-MCNC: 121 MG/DL (ref 70–105)
HGB BLD-MCNC: 8.2 G/DL (ref 14–18)
PLATELET # BLD AUTO: 237 THOU/UL (ref 130–400)
POTASSIUM SERPL-SCNC: 3.7 MMOL/L (ref 3.5–5.1)
SODIUM SERPL-SCNC: 132 MMOL/L (ref 136–145)

## 2018-09-23 RX ADMIN — Medication SCH ML: at 08:11

## 2018-09-23 RX ADMIN — Medication SCH: at 21:18

## 2018-09-23 RX ADMIN — FAMOTIDINE SCH MG: 10 INJECTION, SOLUTION INTRAVENOUS at 08:11

## 2018-09-23 RX ADMIN — FAMOTIDINE SCH: 10 INJECTION, SOLUTION INTRAVENOUS at 21:18

## 2018-09-23 NOTE — PDOC.PN
- Subjective


Encounter Start Date: 09/23/18


Encounter Start Time: 17:15


Subjective: f/u for hepatic mass with obstructive jaundice s/p bx with pending


-: pathology. Itching improved with Benadryl. No new complaints, abd


-: pain, N/V. 





- Objective


Resuscitation Status: 


 











Resuscitation Status           FULL:Full Resuscitation














MAR Reviewed: Yes


Vital Signs & Weight: 


 Vital Signs (12 hours)











  Temp Pulse Resp BP Pulse Ox


 


 09/23/18 08:00  98.5 F  85  20  122/72  97








 Weight











Weight                         192 lb 8 oz














I&O: 


 











 09/22/18 09/23/18 09/24/18





 06:59 06:59 06:59


 


Intake Total 1350 1200 


 


Balance 1350 1200 











Result Diagrams: 


 09/23/18 03:51





 09/23/18 03:51


Additional Labs: 





 Laboratory Tests











  09/20/18 09/20/18 09/20/18





  00:47 00:47 05:43


 


Hgb   8.0 L  7.6 L


 


Plt Count   243 


 


Neutrophils % (Manual)   85 H  77 H


 


PT   


 


INR   


 


APTT   


 


Iron   


 


TIBC   


 


% Saturation   


 


Total Bilirubin  18.6 H  


 


AST  117 H  


 


ALT  111 H  


 


Alkaline Phosphatase  602 H  


 


Carcinoembryonic Ag   














  09/20/18 09/20/18 09/20/18





  05:43 08:40 08:40


 


Hgb   


 


Plt Count   


 


Neutrophils % (Manual)   


 


PT    14.8 H


 


INR    1.1


 


APTT    29.0


 


Iron   19 L 


 


TIBC   344 


 


% Saturation   6 L 


 


Total Bilirubin   


 


AST   


 


ALT   


 


Alkaline Phosphatase   


 


Carcinoembryonic Ag  6.96 H  














  09/21/18 09/21/18





  04:25 04:25


 


Hgb  


 


Plt Count  


 


Neutrophils % (Manual)   65


 


PT  


 


INR  


 


APTT  


 


Iron  


 


TIBC  


 


% Saturation  


 


Total Bilirubin  15.5 H 


 


AST  120 H 


 


ALT  102 H 


 


Alkaline Phosphatase  560 H 


 


Carcinoembryonic Ag  














Phys Exam





- Physical Examination


Constitutional: NAD


+ jaundice


+ scleral icterus


HEENT: PERRLA, oral pharynx no lesions


Neck: no nodes, no JVD, supple, full ROM


Respiratory: no wheezing, no rales, no rhonchi, clear to auscultation bilateral


S1, S2


Cardiovascular: RRR, no significant murmur, no rub, gallop


minimal TTP in RUQ


Gastrointestinal: soft, no distention, positive bowel sounds


Musculoskeletal: no edema, pulses present


Neurological: non-focal, normal sensation, moves all 4 limbs


Psychiatric: normal affect, A&O x 3


Deviation from normal: + jaundice


Skin: normal turgor, cap refill <2 seconds





Dx/Plan


(1) Liver mass


Code(s): R16.0 - HEPATOMEGALY, NOT ELSEWHERE CLASSIFIED   Status: Acute   

Comment: Suspect malignant process, s/p CT guided percutaneous bx with 

pathology pending, Medical Oncology consulted   





(2) Hyperbilirubinemia


Code(s): E80.6 - OTHER DISORDERS OF BILIRUBIN METABOLISM   Status: Acute   

Comment: Secondary to obstructive hepatic mass, continue IVF's, see #1   





(3) Melena


Code(s): K92.1 - MELENA   Status: Acute   Comment: ? chronic, serial H/H, GI 

consulted, avoid NSAIDs and anticoagulation, H/H stable currently   





(4) Normocytic anemia


Code(s): D64.9 - ANEMIA, UNSPECIFIED   Status: Chronic   Comment: ? chronicity 

without prior labs to compare, s/p 1u PRBC's, serial H/H, s/p Iron infusion   





(5) Iron deficiency


Code(s): E61.1 - IRON DEFICIENCY   Status: Acute   Comment: s/p Iron Sucrose 

infusion, likely will need iron replacement longterm   





(6) Neoplasm of sigmoid colon


Code(s): D49.0 - NEOPLASM OF UNSPECIFIED BEHAVIOR OF DIGESTIVE SYSTEM   Status: 

Acute   Comment: suspected on CT imaging, will need colonoscopy to confirm, 

potential malignant process given hepatic masses   





- Plan


, DVT proph w/SCDs


Stable overall


-: Awaiting final pathology from liver bx


-: Continue Benadryl prn itching secondary to hyperbilirubinemia


-: Will need ERCP with hepatic stent placement and colonoscopy


-: CM assisting with [facility] transfer when bed is available





* .

## 2018-09-23 NOTE — PRG
DATE OF SERVICE:  09/23/2018

 

SUBJECTIVE:  Mr. Zimmerman is tolerating his diet well.  He has no abdominal pain.  A bowel movement ye
sterday without any blood.  He did have some itching that was well controlled with Benadryl.

 

OBJECTIVE:

VITAL SIGNS:  Temperature 98.0, pulse 87, blood pressure 114/69.

GENERAL:  He is in no acute distress.  He is jaundiced.

LUNGS:  Clear to auscultation bilaterally.

HEART:  Regular rate and rhythm.

ABDOMEN:  Soft, nontender, nondistended.  Bowel sounds are present.

EXTREMITIES:  No lower extremity edema.

 

ASSESSMENT AND PLAN:

1.  Obstructive jaundice secondary to malignant stricture in the proximal bile ducts.

2.  Apparent metastatic cancer to the liver.  Most likely primary is colon:  He did indeed have a 5 c
m mass in the sigmoid by CT scan.  The CEA level is elevated.

 

RECOMMENDATIONS:  We are awaiting histopathology from liver biopsy.  We are awaiting transfer to [facility].  ERCP with biliary stenting will ultimately be required.  There is no urgency to this now as dimas ocampo is asymptomatic without any signs of infection.  Colonoscopy can also be considered to evaluate for
 primary cancer; however, if he has significant metastatic lesions all of the liver, then I will awai
t Oncology recommendations to see if this would actually .

## 2018-09-24 RX ADMIN — FAMOTIDINE SCH MG: 10 INJECTION, SOLUTION INTRAVENOUS at 10:07

## 2018-09-24 RX ADMIN — Medication SCH: at 20:04

## 2018-09-24 RX ADMIN — Medication SCH ML: at 10:07

## 2018-09-24 NOTE — CT
CT GUIDED LIVER BIOPSY

9/20/18

 

CLINICAL HISTORY: 

Colonic mass with multiple liver masses. Jaundice. 

 

PROCEDURE:

Informed consent was obtained and the patient was escorted to the procedural suite and placed into a 
semisupine position. The right abdomen was prepped and draped in the standard sterile fashion and mas
s of interest within the central aspect of the liver was localized. Conscious sedation was provided t
o the patient and patient was monitored accordingly, throughout the duration of the exam by the radio
logy nurse, Kayley Yarbrough, in stable condition. After local anesthesia with buffered 1% lidocaine wa
s performed, a small skin incision was made through which a 17 gauge trocar was advanced to the mass 
of interest within the central aspect of the liver. The inner stylet was removed and was exchanged fo
r an 18 gauge biopsy device. Two separate core specimens were acquired and were submitted to the path
ologist attending the case, Dr. John Paul Ventura. The specimens were reviewed during the exam and were d
eemed adequate for interpretation. Therefore, the exam was terminated and all devices were removed fr
om the patient. There were no procedural complications. Postprocedural imaging revealed no significan
t complication.

 

IMPRESSION:  

Technically successful CT guided biopsy of hepatic mass. Pathology results are pending.

## 2018-09-24 NOTE — PDOC.PN
- Subjective


Encounter Start Date: 09/24/18


Encounter Start Time: 15:20


Subjective: f/u for hepatic mass s/p bx showing adenocarcinoma preliminarily.


-: No new complaints currently.





- Objective


Resuscitation Status: 


 











Resuscitation Status           FULL:Full Resuscitation














MAR Reviewed: Yes


Vital Signs & Weight: 


 Vital Signs (12 hours)











  Temp Pulse BP Pulse Ox


 


 09/24/18 08:10  98.5 F  97  107/61  93 L


 


 09/24/18 08:00     93 L








 Weight











Weight                         192 lb 8 oz














I&O: 


 











 09/23/18 09/24/18 09/25/18





 06:59 06:59 06:59


 


Intake Total 1200 1200 


 


Balance 1200 1200 











Result Diagrams: 


 09/23/18 03:51





 09/23/18 03:51


Additional Labs: 





 Laboratory Tests











  09/20/18 09/20/18 09/20/18





  00:47 00:47 05:43


 


Hgb   8.0 L  7.6 L


 


Plt Count   243 


 


Neutrophils % (Manual)   85 H  77 H


 


PT   


 


INR   


 


APTT   


 


Iron   


 


TIBC   


 


% Saturation   


 


Total Bilirubin  18.6 H  


 


AST  117 H  


 


ALT  111 H  


 


Alkaline Phosphatase  602 H  


 


Carcinoembryonic Ag   














  09/20/18 09/20/18 09/20/18





  05:43 08:40 08:40


 


Hgb   


 


Plt Count   


 


Neutrophils % (Manual)   


 


PT    14.8 H


 


INR    1.1


 


APTT    29.0


 


Iron   19 L 


 


TIBC   344 


 


% Saturation   6 L 


 


Total Bilirubin   


 


AST   


 


ALT   


 


Alkaline Phosphatase   


 


Carcinoembryonic Ag  6.96 H  














  09/21/18 09/21/18





  04:25 04:25


 


Hgb  


 


Plt Count  


 


Neutrophils % (Manual)   65


 


PT  


 


INR  


 


APTT  


 


Iron  


 


TIBC  


 


% Saturation  


 


Total Bilirubin  15.5 H 


 


AST  120 H 


 


ALT  102 H 


 


Alkaline Phosphatase  560 H 


 


Carcinoembryonic Ag  














Phys Exam





- Physical Examination


Constitutional: NAD


+ scleral icterus and jaundice


HEENT: PERRLA


Neck: no nodes, no JVD, supple, full ROM


Respiratory: no wheezing, no rales, no rhonchi, clear to auscultation bilateral


Cardiovascular: RRR, no significant murmur, no rub, gallop


mild TTP in RUQ


Gastrointestinal: soft, no distention, positive bowel sounds


Musculoskeletal: no edema, pulses present


Neurological: non-focal, normal sensation, moves all 4 limbs


Psychiatric: normal affect, A&O x 3


Deviation from normal: + jaundice


Skin: normal turgor, cap refill <2 seconds





Dx/Plan


(1) Liver mass


Code(s): R16.0 - HEPATOMEGALY, NOT ELSEWHERE CLASSIFIED   Status: Acute   

Comment: Apparent adenocarcinoma by pathology however exact type unclear,  s/p 

CT guided percutaneous bx, will need further review to delineate   





(2) Hyperbilirubinemia


Code(s): E80.6 - OTHER DISORDERS OF BILIRUBIN METABOLISM   Status: Acute   

Comment: Secondary to obstructive hepatic mass, continue IVF's, see #1   





(3) Melena


Code(s): K92.1 - MELENA   Status: Acute   Comment: ? chronic, serial H/H, GI 

consulted, avoid NSAIDs and anticoagulation, H/H stable currently   





(4) Normocytic anemia


Code(s): D64.9 - ANEMIA, UNSPECIFIED   Status: Chronic   Comment: ? chronicity 

without prior labs to compare, s/p 1u PRBC's, serial H/H, s/p Iron infusion   





(5) Iron deficiency


Code(s): E61.1 - IRON DEFICIENCY   Status: Acute   Comment: s/p Iron Sucrose 

infusion, likely will need iron replacement longterm   





(6) Neoplasm of sigmoid colon


Code(s): D49.0 - NEOPLASM OF UNSPECIFIED BEHAVIOR OF DIGESTIVE SYSTEM   Status: 

Acute   Comment: Suspected on CT imaging, will need endoscopy to confirm   





- Plan


, out of bed/ambulate, DVT proph w/SCDs


Stable currently


-: Continue Benadryl prn itching


-: Change Pepcid 20mg BID


-: Await [facility] bed availability


-: Likely transfer in next 24-48h





* .

## 2018-09-24 NOTE — PRG
DATE OF SERVICE:  09/24/2018.

 

SUBJECTIVE:  Mr. Zimmerman has no pain.  He is tolerating his diet well.

 

OBJECTIVE:

VITAL SIGNS:  Temperature 98.5, pulse 97, blood pressure 107/61.

ABDOMEN:  Soft, nontender.  He is jaundiced.

 

LABORATORY DATA:  No new labs today.

 

IMPRESSION:

1.  Obstructive jaundice secondary to metastatic disease causing stricturing of the proximal bile felicia
ts.

2.  Metastatic cancer to the liver with elevated CEA.  Liver biopsy results are pending.  There is ap
parently a sigmoid mass by imaging.

3.  Anemia of chronic blood loss.

 

RECOMMENDATIONS:  He is awaiting transfer to [place] where ERCP with stenting of the proximal bile 
ducts can be considered with temporary versus permanent stenting depending on biopsy results.  Treatm
ent plan for the cancer will also be determined there.  Lower endoscopy to evaluate suspected colon m
ass can also be performed if this is to effect the oncology treatment plan.  For now, he has no obstr
uctive symptoms or changes in his bowel habits other than mild hematochezia, with a stable hemoglobin
.

## 2018-09-25 VITALS — SYSTOLIC BLOOD PRESSURE: 104 MMHG | DIASTOLIC BLOOD PRESSURE: 62 MMHG | TEMPERATURE: 98.4 F

## 2018-09-25 RX ADMIN — Medication SCH: at 08:33

## 2018-09-25 NOTE — PDOC.PN
- Subjective


Encounter Start Date: 09/25/18


Encounter Start Time: 14:00


Subjective: f/u for liver masses with bx showing adenocarcinoma. Awaiting 

transfer


-: and bed availability at [facility].





- Objective


Resuscitation Status: 


 











Resuscitation Status           FULL:Full Resuscitation














MAR Reviewed: Yes


Vital Signs & Weight: 


 Vital Signs (12 hours)











  Temp Pulse Resp BP Pulse Ox


 


 09/25/18 08:00      100


 


 09/25/18 07:16  98.4 F  95  20  104/62  100








 Weight











Weight                         192 lb 8 oz














I&O: 


 











 09/24/18 09/25/18 09/26/18





 06:59 06:59 06:59


 


Intake Total 1200 2000 


 


Balance 1200 2000 











Result Diagrams: 


 09/23/18 03:51





 09/23/18 03:51


Additional Labs: 





 Laboratory Tests











  09/20/18 09/20/18 09/20/18





  00:47 00:47 05:43


 


Hgb   8.0 L  7.6 L


 


Plt Count   243 


 


Neutrophils % (Manual)   85 H  77 H


 


PT   


 


INR   


 


APTT   


 


Iron   


 


TIBC   


 


% Saturation   


 


Total Bilirubin  18.6 H  


 


AST  117 H  


 


ALT  111 H  


 


Alkaline Phosphatase  602 H  


 


Carcinoembryonic Ag   














  09/20/18 09/20/18 09/20/18





  05:43 08:40 08:40


 


Hgb   


 


Plt Count   


 


Neutrophils % (Manual)   


 


PT    14.8 H


 


INR    1.1


 


APTT    29.0


 


Iron   19 L 


 


TIBC   344 


 


% Saturation   6 L 


 


Total Bilirubin   


 


AST   


 


ALT   


 


Alkaline Phosphatase   


 


Carcinoembryonic Ag  6.96 H  














  09/21/18 09/21/18





  04:25 04:25


 


Hgb  


 


Plt Count  


 


Neutrophils % (Manual)   65


 


PT  


 


INR  


 


APTT  


 


Iron  


 


TIBC  


 


% Saturation  


 


Total Bilirubin  15.5 H 


 


AST  120 H 


 


ALT  102 H 


 


Alkaline Phosphatase  560 H 


 


Carcinoembryonic Ag  














Phys Exam





- Physical Examination


Constitutional: NAD


alert, smiling, responsive


+ scleral icterus


HEENT: PERRLA, oral pharynx no lesions


Neck: no nodes, no JVD, supple, full ROM


Respiratory: no wheezing, no rales, no rhonchi, clear to auscultation bilateral


S1, S2


Cardiovascular: RRR, no significant murmur, no rub, gallop


Gastrointestinal: soft, non-tender, no distention, positive bowel sounds


Musculoskeletal: no edema, pulses present


Neurological: non-focal, normal sensation, moves all 4 limbs


Psychiatric: normal affect, A&O x 3


Deviation from normal: + jaundice


Skin: normal turgor, cap refill <2 seconds





Dx/Plan


(1) Liver mass


Code(s): R16.0 - HEPATOMEGALY, NOT ELSEWHERE CLASSIFIED   Status: Acute   

Comment: Apparent adenocarcinoma by pathology however exact type unclear,  s/p 

CT guided percutaneous bx, will need further review to delineate, plan for [facility] 

transfer for definitive tx to include chemotherapy   





(2) Hyperbilirubinemia


Code(s): E80.6 - OTHER DISORDERS OF BILIRUBIN METABOLISM   Status: Acute   

Comment: Secondary to obstructive hepatic mass, continue IVF's, see #1   





(3) Melena


Code(s): K92.1 - MELENA   Status: Acute   Comment: ? chronic, serial H/H, GI 

consulted, avoid NSAIDs and anticoagulation, H/H stable currently   





(4) Normocytic anemia


Code(s): D64.9 - ANEMIA, UNSPECIFIED   Status: Chronic   Comment: ? chronicity 

without prior labs to compare, s/p 1u PRBC's, serial H/H, s/p Iron infusion   





(5) Iron deficiency


Code(s): E61.1 - IRON DEFICIENCY   Status: Acute   Comment: s/p Iron Sucrose 

infusion, likely will need iron replacement longterm   





(6) Neoplasm of sigmoid colon


Code(s): D49.0 - NEOPLASM OF UNSPECIFIED BEHAVIOR OF DIGESTIVE SYSTEM   Status: 

Acute   Comment: Suspected on CT imaging, will need endoscopy to confirm   





- Plan


, out of bed/ambulate


Stable currently


-: Await final pathology to delineate subtype of adenocarcinoma


-: Await [facility] bed availability


-: OOB/ambulate 


-: Benadryl prn pruritus





* .

## 2018-09-25 NOTE — PRG
DATE OF SERVICE:  09/25/2018

 

SUBJECTIVE:  Mr. Zimmerman has no complaints.  He is tolerating a solid diet well.

 

OBJECTIVE:

VITAL SIGNS:  Temperature is 98.4, pulse 95, blood pressure 104/62.

GENERAL:  He is jaundiced, in no acute distress, alert and oriented x3.

LUNGS:  Clear to auscultation bilaterally.

HEART:  Regular rate and rhythm without murmur.

ABDOMEN:  Soft, nontender, nondistended.  Bowel sounds are present.

EXTREMITIES:  No lower extremity edema.

 

IMPRESSION:

1.  Metastatic cancer to the liver.  Liver biopsy shows adenocarcinoma.  There is a question of hepat
cory raised as well.  Of note, he is negative for viral hepatitis and there is not a known history of 
cirrhosis prior to this.  There is a possible colon mass suggested by outside CT scan.

2.  Obstructive jaundice secondary to obstruction of the proximal bile ducts from the cancer.

 

RECOMMENDATIONS:

1.  Additional stains are being performed on the biopsy specimen to help differentiate primary.

2.  He will require colonoscopy to evaluate for primary source versus second primary in the colon.

3.  He will require ERCP with stenting of the bile ducts before he can receive chemotherapy.

4.  He will require transfer to [facility] before any chemotherapy will be decided on.  At this 
oint, he is awaiting transfer also for stenting of the biliary system.  If he is expected to have con
tinued delay of transfer, then colonoscopy can be performed here to evaluate for a colon mass.  We wi
ll await additional stains if they are to be done on his original biopsy specimen.

## 2018-09-26 NOTE — DIS
DATE OF ADMISSION:  09/20/2018

 

DATE OF DISCHARGE:  09/25/2018

 

DISCHARGE DIAGNOSES:

1.  Metastatic adenocarcinoma of the liver.

2.  Hyperbilirubinemia secondarily to metastatic adenocarcinoma of the liver with jaundice.

3.  Melena.

4.  Normocytic anemia, status post 1 unit of packed red blood cells and iron infusion.

5.  Iron deficiency, status post iron sucrose infusion.

6.  Sigmoid colon mass, suspected.

 

CONSULTATIONS:  Dr. Florez with GI service.  Dr. Alvino Jones with Medical Oncology Service.

 

PERTINENT LABORATORY AND X-RAY FINDINGS:  Sodium ranged between 132 to 136.  Serum iron 19, TIBC 344,
 ferritin 58.  Total bilirubin ranged between 14.6 to 18.6.  AST ranged between 107 to 120.  ALT rang
ed between 100 to 111.  Alkaline phosphatase ranged between 551 to 602.  Lipase 4.  Carcinoembryonic 
antigen 6.96.  CBC showed a hemoglobin ranging between 7.6 to 8.3, MCV 81, platelet count 237.  Hepat
itis A, B, and C panel on 09/21/2018 negative.  CT of the abdomen and pelvis dated 09/20/2018 showed 
7.9 x 5.5 cm mass in the central aspect of the right hepatic lobe.  Liver pathology dated 09/20/2018 
showed metastatic adenocarcinoma, moderately differentiated.

 

HOSPITAL COURSE:  Patient was initially admitted to the medical oncology unit after initially present
ing with melena, status post 1 unit of packed red blood cells.  The patient underwent GI evaluation d
uring the hospital course with serial hemoglobins showing stable values.  The patient also underwent 
CT imaging of the abdomen showing evidence of hepatic mass concerning for malignant process.  The pat
ient underwent CT-guided biopsy with pathology showing moderately differentiated metastatic adenocarc
inoma.  The patient also received an iron sucrose infusion due to low iron stores and tolerated witho
ut difficulty.  The patient essentially remained asymptomatic throughout the hospital course with sta
ble vital signs and no documented fever.  The patient tolerated regular oral intake and had regular b
owel movements.  Current recommendations per GI are for consideration of hepatic stent placement more
 proximal with consideration of serial stent placement versus long-term stent placement for obstructi
ve process.  The patient also recommended for colonoscopy exam to assess potential sigmoid mass.  The
 patient will also need evaluation regarding initiation of chemotherapy for metastatic adenocarcinoma
.  I have examined the patient at the time of discharge and discussed followup instructions.  The pat
ient overall clinically stable and ready for discharge on 09/25/2018.

 

DISCHARGE MEDICATIONS:

1.  Benadryl 25 mg p.o. q.6 hours p.r.n. itching.

2.  Pepcid 20 mg p.o. b.i.d.

3.  Nicotine patch 21 mg transdermally q.24 hours.

 

FOLLOWUP:  The patient to follow up with [facility] Internal Medicine Service and Medical Oncology Service 
in [place].

 

DIET:  Regular.

 

ACTIVITY:  Ad luz.

 

CODE STATUS:  FULL.

 

DISPOSITION:  Discharged to [facility] in [place] for further evaluation on
 09/25/2018.

 

Total time preparing and coordinating discharge, 32 minutes.